# Patient Record
Sex: FEMALE | Race: WHITE | Employment: FULL TIME | URBAN - METROPOLITAN AREA
[De-identification: names, ages, dates, MRNs, and addresses within clinical notes are randomized per-mention and may not be internally consistent; named-entity substitution may affect disease eponyms.]

---
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
## 2023-10-30 ENCOUNTER — OFFICE VISIT (OUTPATIENT)
Age: 23
End: 2023-10-30

## 2023-10-30 VITALS
SYSTOLIC BLOOD PRESSURE: 134 MMHG | BODY MASS INDEX: 17.74 KG/M2 | HEART RATE: 97 BPM | TEMPERATURE: 98.7 F | DIASTOLIC BLOOD PRESSURE: 78 MMHG | OXYGEN SATURATION: 97 % | WEIGHT: 113 LBS | HEIGHT: 67 IN

## 2023-10-30 DIAGNOSIS — J02.0 ACUTE STREPTOCOCCAL PHARYNGITIS: Primary | ICD-10-CM

## 2023-10-30 PROBLEM — F32.A ANXIETY AND DEPRESSION: Status: ACTIVE | Noted: 2019-08-12

## 2023-10-30 PROBLEM — F41.9 ANXIETY AND DEPRESSION: Status: ACTIVE | Noted: 2019-08-12

## 2023-10-30 PROBLEM — R63.4 WEIGHT LOSS: Status: ACTIVE | Noted: 2021-04-19

## 2023-10-30 PROBLEM — N39.0 FREQUENT UTI: Status: ACTIVE | Noted: 2020-06-26

## 2023-10-30 PROBLEM — J30.2 SEASONAL ALLERGIES: Status: ACTIVE | Noted: 2021-04-19

## 2023-10-30 PROBLEM — R41.840 ATTENTION AND CONCENTRATION DEFICIT: Status: ACTIVE | Noted: 2020-12-14

## 2023-10-30 LAB
Lab: NORMAL
QC PASS/FAIL: NORMAL
S PYO AG THROAT QL: NORMAL
SARS-COV-2 RDRP RESP QL NAA+PROBE: NEGATIVE

## 2023-10-30 RX ORDER — NORGESTIMATE AND ETHINYL ESTRADIOL 7DAYSX3 28
1 KIT ORAL DAILY
COMMUNITY
Start: 2020-01-05

## 2023-10-30 RX ORDER — METHYLPREDNISOLONE 4 MG/1
TABLET ORAL
Qty: 1 KIT | Refills: 0 | Status: SHIPPED | OUTPATIENT
Start: 2023-10-30

## 2023-10-30 RX ORDER — DESVENLAFAXINE SUCCINATE 50 MG/1
50 TABLET, EXTENDED RELEASE ORAL DAILY
COMMUNITY
Start: 2023-07-07

## 2023-10-30 NOTE — PATIENT INSTRUCTIONS
- Steroids will cause your glucose (blood sugar) levels to rise. If you are diabetic, please monitor your glucose levels carefully. Watch your food intake and take caution with foods high in sugar and carbohydrates as weight gain is another side effect of steroid use. Elevated heart rate is another common finding with steroids. Take this medication with food as it can irritate your stomach to the same degree that ibuprofen would. Bruce Damon,    It was an absolute pleasure taking care of you today. I'm hoping you'll start feeling better as soon as possible. Please call me if you have any questions or concerns about what we talked about today. Feel free stop back in if anything changes or things seem to be getting worse. If for some reason you develop any serious or potentially life-threatening issue, call 911 or proceed to the nearest emergency department. Hopefully it will never come to that. Otherwise, it was very nice to meet you Bruce Damon.       Thanks,     Wicho Baker

## 2023-10-31 RX ORDER — AZITHROMYCIN 250 MG/1
250 TABLET, FILM COATED ORAL SEE ADMIN INSTRUCTIONS
Qty: 6 TABLET | Refills: 0 | Status: SHIPPED | OUTPATIENT
Start: 2023-10-31 | End: 2023-11-05

## 2024-05-14 ENCOUNTER — OFFICE VISIT (OUTPATIENT)
Age: 24
End: 2024-05-14

## 2024-05-14 VITALS
OXYGEN SATURATION: 98 % | WEIGHT: 122.8 LBS | HEIGHT: 67 IN | SYSTOLIC BLOOD PRESSURE: 135 MMHG | BODY MASS INDEX: 19.27 KG/M2 | HEART RATE: 79 BPM | TEMPERATURE: 98.1 F | DIASTOLIC BLOOD PRESSURE: 86 MMHG

## 2024-05-14 DIAGNOSIS — Z23 IMMUNIZATION DUE: ICD-10-CM
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
## 2024-05-14 DIAGNOSIS — S61.259A CAT BITE OF FINGER, INITIAL ENCOUNTER: Primary | ICD-10-CM

## 2024-05-14 RX ORDER — AMOXICILLIN AND CLAVULANATE POTASSIUM 875; 125 MG/1; MG/1
1 TABLET, FILM COATED ORAL 2 TIMES DAILY
Qty: 10 TABLET | Refills: 0 | Status: SHIPPED | OUTPATIENT
Start: 2024-05-14 | End: 2024-05-19

## 2024-05-14 NOTE — PROGRESS NOTES
Tena Mckeon (:  2000) is a 23 y.o. female,New patient, here for evaluation of the following chief complaint(s):  Animal Bite (Patient c/o cat bite on right pinky finger that took place 2 days ago. Minor pain, having concerns of infection)      ASSESSMENT/PLAN:  Visit Diagnoses and Associated Orders       Cat bite of finger, initial encounter    -  Primary    amoxicillin-clavulanate (AUGMENTIN) 875-125 MG per tablet [27493]      Tdap, BOOSTRIX, (age 10 yrs+), IM [75786 Custom]           Immunization due        Tdap, BOOSTRIX, (age 10 yrs+), IM [24793 Custom]                  Due to risk for infection with cat bite, will begin oral antibiotic.  Take Augmentin 3-5 days as prescribed.  Continue basic wound care as discussed.  Tetanus was updated with Tdap reported out of date.  Return if symptoms persist or change and especially any signs of worsening infection.      SUBJECTIVE/OBJECTIVE:      History provided by:  Patient   used: No    Animal Bite   The incident occurred yesterday (Family cat bite while playing). The incident occurred at home. There is an injury to the Right little finger. The pain is mild. It is unlikely that a foreign body is present. She is Right-handed. Her tetanus status is out of date. She has been Behaving normally.       ROS: See HPI       Vitals:    24 1226   BP: 135/86   Site: Left Upper Arm   Position: Sitting   Cuff Size: Medium Adult   Pulse: 79   Temp: 98.1 °F (36.7 °C)   TempSrc: Oral   SpO2: 98%   Weight: 55.7 kg (122 lb 12.8 oz)   Height: 1.702 m (5' 7\")       No results found for this visit on 24.     Physical Exam  Vitals and nursing note reviewed.   Constitutional:       General: She is not in acute distress.     Appearance: She is not diaphoretic.   HENT:      Nose: Nose normal.      Mouth/Throat:      Mouth: Mucous membranes are moist.   Pulmonary:      Effort: Pulmonary effort is normal. No respiratory distress.

## 2024-05-14 NOTE — PATIENT INSTRUCTIONS
Due to risk for infection with cat bite, will begin oral antibiotic.  Take Augmentin 3-5 days as prescribed.  Continue basic wound care as discussed.  Tetanus was updated with Tdap reported out of date.  Return if symptoms persist or change and especially any signs of worsening infection.